# Patient Record
Sex: FEMALE | Race: WHITE
[De-identification: names, ages, dates, MRNs, and addresses within clinical notes are randomized per-mention and may not be internally consistent; named-entity substitution may affect disease eponyms.]

---

## 2017-01-11 ENCOUNTER — HOSPITAL ENCOUNTER (OUTPATIENT)
Dept: HOSPITAL 17 - PLAB | Age: 77
End: 2017-01-11
Attending: FAMILY MEDICINE
Payer: COMMERCIAL

## 2017-01-11 DIAGNOSIS — I10: Primary | ICD-10-CM

## 2017-01-11 LAB
ALP SERPL-CCNC: 68 U/L (ref 45–117)
ALT SERPL-CCNC: 22 U/L (ref 10–53)
ANION GAP SERPL CALC-SCNC: 6 MEQ/L (ref 5–15)
AST SERPL-CCNC: 15 U/L (ref 15–37)
BILIRUB SERPL-MCNC: 0.5 MG/DL (ref 0.2–1)
BUN SERPL-MCNC: 18 MG/DL (ref 7–18)
CHLORIDE SERPL-SCNC: 104 MEQ/L (ref 98–107)
CREAT UR-MCNC: 77 MG/DL (ref 27–300)
GFR SERPLBLD BASED ON 1.73 SQ M-ARVRAT: 61 ML/MIN (ref 89–?)
HCO3 BLD-SCNC: 30.2 MEQ/L (ref 21–32)
MICRO ALBUMIN RANDOM URINE RAW: 14 MG/L (ref 0–30)
MICROALBUMIN/CREAT RATIO RAND: 18 MG/G CRE (ref 0–30)
POTASSIUM SERPL-SCNC: 4.2 MEQ/L (ref 3.5–5.1)
SODIUM SERPL-SCNC: 140 MEQ/L (ref 136–145)

## 2017-01-11 PROCEDURE — 82043 UR ALBUMIN QUANTITATIVE: CPT

## 2017-01-11 PROCEDURE — 80053 COMPREHEN METABOLIC PANEL: CPT

## 2017-01-20 ENCOUNTER — HOSPITAL ENCOUNTER (EMERGENCY)
Dept: HOSPITAL 17 - NEPB | Age: 77
Discharge: HOME | End: 2017-01-20
Payer: COMMERCIAL

## 2017-01-20 VITALS
OXYGEN SATURATION: 95 % | HEART RATE: 94 BPM | RESPIRATION RATE: 18 BRPM | TEMPERATURE: 98.5 F | DIASTOLIC BLOOD PRESSURE: 87 MMHG | SYSTOLIC BLOOD PRESSURE: 150 MMHG

## 2017-01-20 VITALS — BODY MASS INDEX: 25.71 KG/M2 | HEIGHT: 65 IN | WEIGHT: 154.32 LBS

## 2017-01-20 DIAGNOSIS — E11.9: ICD-10-CM

## 2017-01-20 DIAGNOSIS — Q61.3: ICD-10-CM

## 2017-01-20 DIAGNOSIS — S16.1XXA: Primary | ICD-10-CM

## 2017-01-20 DIAGNOSIS — I10: ICD-10-CM

## 2017-01-20 DIAGNOSIS — E78.00: ICD-10-CM

## 2017-01-20 PROCEDURE — 96372 THER/PROPH/DIAG INJ SC/IM: CPT

## 2017-01-20 PROCEDURE — 99283 EMERGENCY DEPT VISIT LOW MDM: CPT

## 2017-01-20 NOTE — PD
HPI


Chief Complaint:  Musculoskeletal Complaint


Time Seen by Provider:  00:38


Travel History


International Travel<30 days:  No


Contact w/Intl Traveler<30days:  No


Traveled to known affect area:  No





History of Present Illness


HPI


76-year-old female with history of polycystic kidney disease presents for 

evaluation of neck stiffness.  Symptoms started 3 days ago.  She reports that 

she woke up with a stiffness in the right side of her neck.  The discomfort is 

worse with movement of the neck which prompted evaluation.  She has not used 

any over-the-counter medication for symptom relief.  She denies any trauma.  

She denies any numbness, tingling, weakness in the extremities.  No chest pain 

or shortness of breath.  No other complaints at this time.





PFSH


Past Medical History


Cancer:  No


Cardiovascular Problems:  Yes (HTN)


High Cholesterol:  Yes


Diabetes:  Yes (DIET CONTROLLED)


Diminished Hearing:  No


Endocrine:  Yes


Glaucoma:  No


Genitourinary:  No


Hepatitis:  No


Hiatal Hernia:  No


Hypertension:  Yes


Immune Disorder:  No


Musculoskeletal:  No


Neurologic:  No


Psychiatric:  No


Reproductive:  No


Respiratory:  No


Thyroid Disease:  No


Menopausal:  Yes





Past Surgical History


AICD:  No


Gynecologic Surgery:  Yes (TUBAL LIG.)


Joint Replacement:  No


Pacemaker:  No


Other Surgery:  Yes (CYST REMOVED FROM RIGHT FOOT -CYST REMOVED)





Social History


Alcohol Use:  Yes (WINE ONCE A MONTH)


Tobacco Use:  No


Substance Use:  No





Allergies-Medications


(Allergen,Severity, Reaction):  


Coded Allergies:  


     Sulfa (Verified  Allergy, Intermediate, DIZZY, VOMITING, 1/20/17)


Reported Meds & Prescriptions





Reported Meds & Active Scripts


Active


Capsagel Maximum Strength Topical (Capsaicin) 0.075% Gel 1 Applic TOPICAL BID 

10 Days


Robaxin (Methocarbamol) 500 Mg Tab 500 Mg PO TID


[walking boot]     


     Left leg


Reported


Lisinopril 10 mg (Lisinopril) 10 Mg Tab 10 Mg PO DAILY


Osteo Bi-Flextriple Triple Tab 1 Tab PO DAILY


Fish Oil 1,000 Mg Cap 1,000 Mg PO DAILY


Lovastatin 20 Mg Tab 20 Mg PO DAILY








Review of Systems


Except as stated in HPI:  all other systems reviewed are Neg





Physical Exam


Narrative


GENERAL: Well-developed well-nourished female in no acute distress, ambulatory 

in the ED.


SKIN: Warm and dry.


HEAD: Atraumatic. Normocephalic. 


EYES: Pupils equal and round. No scleral icterus. No injection or drainage. 


ENT: No nasal bleeding or discharge.  Mucous membranes pink and moist.


NECK: Trachea midline. No JVD. 


CARDIOVASCULAR: Regular rate and rhythm.  No murmur appreciated.


RESPIRATORY: No accessory muscle use. Clear to auscultation. Breath sounds 

equal bilaterally. 


MUSCULOSKELETAL: No obvious deformities.  There is no tenderness to palpation 

along the cervical or thoracic midline spine.  No lymphadenopathy.  There is 

some tenderness to palpation she cervical right-sided paravertebral 

musculature.  The patient maintains full range of motion of the neck.  Full 

range of motion of the upper extremities.


NEUROLOGICAL: Awake and alert. No obvious cranial nerve deficits.  Motor 

grossly within normal limits. Normal speech.





Data


Data


Last Documented VS





Vital Signs








  Date Time  Temp Pulse Resp B/P Pulse Ox O2 Delivery O2 Flow Rate FiO2


 


1/20/17 00:26 98.5 94 18 150/87 95 Room Air  








Orders





 Orphenadrine Inj (Norflex Inj) (1/20/17 01:00)








MDM


Medical Decision Making


Medical Screen Exam Complete:  Yes


Emergency Medical Condition:  Yes


Medical Record Reviewed:  Yes


Differential Diagnosis


Cervical strain, degenerative disc disease, spinal stenosis, malignancy, 

radiculopathy, herniated nucleus pulposus, carotid artery dissection


Narrative Course


76-year-old female who has had stiffness in the right side of her neck for the 

past 3 days after waking up with the discomfort.  Examination reveals a clearly 

musculoskeletal etiology to her symptoms.  Her pain is localized to the right 

cervical paravertebral musculature.  The plan is to treat the patient with 

topical capsaicin cream, oral muscle relaxants.  She has been advised to avoid 

NSAIDs in the past secondary to her history of polycystic kidney disease.  

Chart review reveals normal renal function on January 11.  She is stable for 

discharge.





Diagnosis





 Primary Impression:  


 Cervical strain


 Qualified Code:  S16.1XXA - Cervical strain, initial encounter





***Additional Instructions:


Medication as needed.  Do not drive or drink alcohol when taking Robaxin.  

Alternate cool compresses and warm compresses several times a day.  Follow-up 

in one week with primary care physician.  Return for any emergent medical 

conditions.


***Med/Other Pt SpecificInfo:  Prescription(s) given


Scripts


Capsaicin Topical (Capsagel Maximum Strength Topical)0.075% Gel1 Applic TOPICAL 

BID  10 Days  Ref 0


   Prov:Oz Thompson MD         1/20/17 


Methocarbamol (Robaxin)500 Mg Adm407 Mg PO TID  #20 TAB  Ref 0


   Prov:Oz Thompson MD         1/20/17


Disposition:  01 DISCHARGE HOME


Condition:  Stable








Clarence Saucedo Jan 20, 2017 00:52

## 2017-03-10 ENCOUNTER — HOSPITAL ENCOUNTER (OUTPATIENT)
Dept: HOSPITAL 17 - PLAB | Age: 77
End: 2017-03-10
Attending: FAMILY MEDICINE
Payer: COMMERCIAL

## 2017-03-10 DIAGNOSIS — E11.9: ICD-10-CM

## 2017-03-10 DIAGNOSIS — I10: ICD-10-CM

## 2017-03-10 DIAGNOSIS — Z00.00: Primary | ICD-10-CM

## 2017-03-10 DIAGNOSIS — E78.5: ICD-10-CM

## 2017-03-10 LAB
ALP SERPL-CCNC: 62 U/L (ref 45–117)
ALT SERPL-CCNC: 21 U/L (ref 10–53)
ANION GAP SERPL CALC-SCNC: 8 MEQ/L (ref 5–15)
AST SERPL-CCNC: 18 U/L (ref 15–37)
BASOPHILS # BLD AUTO: 0 TH/MM3 (ref 0–0.2)
BASOPHILS NFR BLD: 0.5 % (ref 0–2)
BILIRUB SERPL-MCNC: 0.8 MG/DL (ref 0.2–1)
BUN SERPL-MCNC: 20 MG/DL (ref 7–18)
CHLORIDE SERPL-SCNC: 100 MEQ/L (ref 98–107)
EOSINOPHIL # BLD: 0.1 TH/MM3 (ref 0–0.4)
EOSINOPHIL NFR BLD: 2.2 % (ref 0–4)
ERYTHROCYTE [DISTWIDTH] IN BLOOD BY AUTOMATED COUNT: 14.9 % (ref 11.6–17.2)
GFR SERPLBLD BASED ON 1.73 SQ M-ARVRAT: 53 ML/MIN (ref 89–?)
HCO3 BLD-SCNC: 28.9 MEQ/L (ref 21–32)
HCT VFR BLD CALC: 37.9 % (ref 35–46)
HDLC SERPL-MCNC: 73.6 MG/DL (ref 40–60)
HEMO FLAGS: (no result)
LDLC SERPL-MCNC: 68 MG/DL (ref 0–99)
LYMPHOCYTES # BLD AUTO: 0.9 TH/MM3 (ref 1–4.8)
LYMPHOCYTES NFR BLD AUTO: 21.8 % (ref 9–44)
MCH RBC QN AUTO: 28.4 PG (ref 27–34)
MCHC RBC AUTO-ENTMCNC: 32.9 % (ref 32–36)
MCV RBC AUTO: 86.5 FL (ref 80–100)
MONOCYTES NFR BLD: 12.1 % (ref 0–8)
NEUTROPHILS # BLD AUTO: 2.7 TH/MM3 (ref 1.8–7.7)
NEUTROPHILS NFR BLD AUTO: 63.4 % (ref 16–70)
PLATELET # BLD: 249 TH/MM3 (ref 150–450)
POTASSIUM SERPL-SCNC: 4.3 MEQ/L (ref 3.5–5.1)
RBC # BLD AUTO: 4.38 MIL/MM3 (ref 4–5.3)
SODIUM SERPL-SCNC: 137 MEQ/L (ref 136–145)
WBC # BLD AUTO: 4.3 TH/MM3 (ref 4–11)

## 2017-03-10 PROCEDURE — 84443 ASSAY THYROID STIM HORMONE: CPT

## 2017-03-10 PROCEDURE — 80053 COMPREHEN METABOLIC PANEL: CPT

## 2017-03-10 PROCEDURE — 80061 LIPID PANEL: CPT

## 2017-03-10 PROCEDURE — 85025 COMPLETE CBC W/AUTO DIFF WBC: CPT

## 2017-04-04 ENCOUNTER — HOSPITAL ENCOUNTER (OUTPATIENT)
Dept: HOSPITAL 17 - CPRE | Age: 77
End: 2017-04-04
Attending: OPTOMETRIST
Payer: COMMERCIAL

## 2017-04-04 DIAGNOSIS — Z01.812: ICD-10-CM

## 2017-04-04 DIAGNOSIS — R94.31: ICD-10-CM

## 2017-04-04 DIAGNOSIS — Z01.810: Primary | ICD-10-CM

## 2017-04-04 DIAGNOSIS — H25.811: ICD-10-CM

## 2017-04-04 LAB
BASOPHILS # BLD AUTO: 0 TH/MM3 (ref 0–0.2)
BASOPHILS NFR BLD: 0.5 % (ref 0–2)
EOSINOPHIL # BLD: 0.2 TH/MM3 (ref 0–0.4)
EOSINOPHIL NFR BLD: 3.2 % (ref 0–4)
ERYTHROCYTE [DISTWIDTH] IN BLOOD BY AUTOMATED COUNT: 15.5 % (ref 11.6–17.2)
HCT VFR BLD CALC: 39.3 % (ref 35–46)
HEMO FLAGS: (no result)
LYMPHOCYTES # BLD AUTO: 1.2 TH/MM3 (ref 1–4.8)
LYMPHOCYTES NFR BLD AUTO: 21.9 % (ref 9–44)
MCH RBC QN AUTO: 27.9 PG (ref 27–34)
MCHC RBC AUTO-ENTMCNC: 32.3 % (ref 32–36)
MCV RBC AUTO: 86.3 FL (ref 80–100)
MONOCYTES NFR BLD: 10.5 % (ref 0–8)
NEUTROPHILS # BLD AUTO: 3.5 TH/MM3 (ref 1.8–7.7)
NEUTROPHILS NFR BLD AUTO: 63.9 % (ref 16–70)
PLATELET # BLD: 247 TH/MM3 (ref 150–450)
RBC # BLD AUTO: 4.56 MIL/MM3 (ref 4–5.3)
WBC # BLD AUTO: 5.4 TH/MM3 (ref 4–11)

## 2017-04-04 PROCEDURE — 85025 COMPLETE CBC W/AUTO DIFF WBC: CPT

## 2017-04-04 PROCEDURE — 93005 ELECTROCARDIOGRAM TRACING: CPT

## 2017-04-04 PROCEDURE — 36415 COLL VENOUS BLD VENIPUNCTURE: CPT

## 2017-04-04 NOTE — EKG
Date Performed: 04/04/2017       Time Performed: 09:38:11

 

PTAGE:      76 years

 

EKG:      SINUS BRADYCARDIA POSSIBLE LEFT ATRIAL ENLARGEMENT BORDERLINE ECG Compared to prior tracing
 no significant change 

 

 PREVIOUS TRACING             5/22/2014

 

DOCTOR:   Kourtney Zuluaga  Interpretating Date/Time  04/04/2017 16:02:10

## 2017-04-20 ENCOUNTER — HOSPITAL ENCOUNTER (OUTPATIENT)
Dept: HOSPITAL 17 - PHSDC | Age: 77
Discharge: HOME | End: 2017-04-20
Attending: OPTOMETRIST
Payer: COMMERCIAL

## 2017-04-20 VITALS
RESPIRATION RATE: 14 BRPM | OXYGEN SATURATION: 97 % | SYSTOLIC BLOOD PRESSURE: 133 MMHG | HEART RATE: 52 BPM | DIASTOLIC BLOOD PRESSURE: 81 MMHG

## 2017-04-20 VITALS — BODY MASS INDEX: 22.5 KG/M2 | HEIGHT: 66 IN | WEIGHT: 139.99 LBS

## 2017-04-20 VITALS
OXYGEN SATURATION: 100 % | SYSTOLIC BLOOD PRESSURE: 162 MMHG | DIASTOLIC BLOOD PRESSURE: 80 MMHG | HEART RATE: 48 BPM | TEMPERATURE: 98 F | RESPIRATION RATE: 16 BRPM

## 2017-04-20 VITALS — TEMPERATURE: 97.6 F

## 2017-04-20 DIAGNOSIS — H25.811: Primary | ICD-10-CM

## 2017-04-20 PROCEDURE — 66984 XCAPSL CTRC RMVL W/O ECP: CPT

## 2017-04-20 PROCEDURE — V2632 POST CHMBR INTRAOCULAR LENS: HCPCS

## 2017-04-20 PROCEDURE — 00142 ANES PX ON EYE LENS SURGERY: CPT

## 2017-04-20 RX ADMIN — LIDOCAINE HYDROCHLORIDE ONE ML: 10 INJECTION, SOLUTION EPIDURAL; INFILTRATION; INTRACAUDAL; PERINEURAL at 10:00

## 2017-04-20 RX ADMIN — TOBRAMYCIN AND DEXAMETHASONE ONE APPLIC: 3; 1 OINTMENT OPHTHALMIC at 00:00

## 2017-04-20 RX ADMIN — LIDOCAINE HYDROCHLORIDE ONE ML: 10 INJECTION, SOLUTION EPIDURAL; INFILTRATION; INTRACAUDAL; PERINEURAL at 00:00

## 2017-04-20 RX ADMIN — TOBRAMYCIN AND DEXAMETHASONE ONE APPLIC: 3; 1 OINTMENT OPHTHALMIC at 10:10

## 2017-04-23 NOTE — MP
cc:

JOSEPH RAMIREZ MD

****

 

 

[facility] #[id number]

 

DATE OF SURGERY: 4/20/2017

 

PREOPERATIVE DIAGNOSIS:

Visually significant cataract, right eye.

 

POSTOPERATIVE DIAGNOSIS:

Visually significant cataract, right eye.

 

OPERATION:

Phacoemulsification with posterior chamber lens

implantation, right eye.

 

SURGEON:

Joseph Ramirez MD

 

ANESTHESIA:

Retrobulbar with MAC.

 

COMPLICATIONS:

None.

 

PROCEDURE:

After informed consent was obtained, the patient was brought into the operative

suite and placed on appropriate monitors by the Anesthesia Service.  The

patient had received a prior retrobulbar injection of local anesthetic by the

Anesthesia Service in the holding area.  The patient's operative eye was then

prepped and draped in the usual sterile fashion.  A wire lid speculum was

placed.

 

A paracentesis incision was made in the peripheral cornea with a 1 mm francisco

keratome.  The anterior chamber was filled with viscoelastic.  The anterior

chamber was then entered through a stepped, clear corneal incision using a

sharp 3 mm francisco keratome.  A circular tear capsulorrhexis was then made with

a bent needle cystitome.  Following hydrodissection of the lens nucleus with

balanced saline, phaco-emulsification of the nucleus was performed using a

modified chopping technique.  The remaining cortex was removed with

irrigation/aspiration.  The prior two procedures were both performed using the

handpieces of the [employer] phaco unit.

 

The capsular bag was then filled with viscoelastic.  The intraocular lens was

then injected into the capsular bag and positioned.  The type of intraocular

lens and its power can be found elsewhere in this chart.  The remaining

viscoelastic was then removed from the anterior chamber with the IA handpiece.

 

 

The anterior chamber was reformed with balanced saline.  The wound was then

closed securely with stromal hydration.  It was found to be watertight to an

intraocular pressure of at least 30 mmHg by palpation.   A small amount of

balanced salt solution was then removed through the paracentesis site and the

intraocular pressure at the end of the case was approximately 20 by palpation.

 

 

All drapes were then removed.  TobraDex ointment was then placed in the eye,

which was closed beneath a semi-pressure patch dressing.

 

The patient tolerated this procedure well and left the operating room awake and

alert.  The patient is to follow-up in my office in the morning.

 

 

 

 

                              _________________________________

                              MD MARLON Jenkins/NAJMA

D:  4/20/2017/10:15 AM

T:  4/23/2017/11:40 AM

Visit #:  [id number]

Job #:  [id number]

## 2017-06-01 ENCOUNTER — HOSPITAL ENCOUNTER (OUTPATIENT)
Dept: HOSPITAL 17 - PHSDC | Age: 77
Discharge: HOME | End: 2017-06-01
Attending: OPTOMETRIST
Payer: COMMERCIAL

## 2017-06-01 VITALS — HEIGHT: 65 IN | WEIGHT: 141.1 LBS | BODY MASS INDEX: 23.51 KG/M2

## 2017-06-01 VITALS
SYSTOLIC BLOOD PRESSURE: 150 MMHG | TEMPERATURE: 97.7 F | DIASTOLIC BLOOD PRESSURE: 77 MMHG | OXYGEN SATURATION: 98 % | RESPIRATION RATE: 20 BRPM

## 2017-06-01 VITALS — HEART RATE: 43 BPM

## 2017-06-01 VITALS
OXYGEN SATURATION: 98 % | HEART RATE: 49 BPM | DIASTOLIC BLOOD PRESSURE: 79 MMHG | SYSTOLIC BLOOD PRESSURE: 143 MMHG | RESPIRATION RATE: 14 BRPM

## 2017-06-01 VITALS — TEMPERATURE: 97.3 F

## 2017-06-01 DIAGNOSIS — Z98.41: ICD-10-CM

## 2017-06-01 DIAGNOSIS — N18.9: ICD-10-CM

## 2017-06-01 DIAGNOSIS — N26.1: ICD-10-CM

## 2017-06-01 DIAGNOSIS — I12.9: ICD-10-CM

## 2017-06-01 DIAGNOSIS — E11.22: ICD-10-CM

## 2017-06-01 DIAGNOSIS — E78.5: ICD-10-CM

## 2017-06-01 DIAGNOSIS — Z88.2: ICD-10-CM

## 2017-06-01 DIAGNOSIS — H25.812: Primary | ICD-10-CM

## 2017-06-01 DIAGNOSIS — I27.2: ICD-10-CM

## 2017-06-01 PROCEDURE — 66984 XCAPSL CTRC RMVL W/O ECP: CPT

## 2017-06-01 PROCEDURE — V2632 POST CHMBR INTRAOCULAR LENS: HCPCS

## 2017-06-02 NOTE — MP
cc:

JOSEPH RAMIREZ M.D.

****

 

[facility] #[id number]

 

DATE OF SURGERY

6/1/17

 

POSTOPERATIVE DIAGNOSIS:     Visually significant cataract left eye.

 

OPERATION:          Phacoemulsification with posterior chamber lens

                  implantation, left eye.

 

SURGEON:          Joseph Ramirez MD

 

ANESTHESIA:       Retrobulbar with MAC.

 

COMPLICATIONS: None.

 

PROCEDURE:        After informed consent was obtained, the patient was brought

into the operative suite and placed on appropriate monitors by the Anesthesia

Service.  The patient had received a prior retrobulbar injection of local

anesthetic by the Anesthesia Service in the holding area.  The patient's

operative eye was then prepped and draped in the usual sterile fashion.  A wire

lid speculum was placed.

 

A paracentesis incision was made in the peripheral cornea with a 1 mm francisco

keratome.  The anterior chamber was filled with viscoelastic.  The anterior

chamber was then entered through a stepped, clear corneal incision using a

sharp 3 mm francisco keratome.  A circular tear capsulorrhexis was then made with

a bent needle cystitome.  Following hydrodissection of the lens nucleus with

balanced saline, phaco-emulsification of the nucleus was performed using a

modified chopping technique.  The remaining cortex was removed with

irrigation/aspiration.  The prior two procedures were both performed using the

handpieces of the [employer] phaco unit.

 

The capsular bag was then filled with viscoelastic.  The intraocular lens was

then injected into the capsular bag and positioned.  The type of intraocular

lens and its power can be found elsewhere in this chart.  The remaining

viscoelastic was then removed from the anterior chamber with the IA handpiece.

 

 

The anterior chamber was reformed with balanced saline.  The wound was then

closed securely with stromal hydration.  It was found to be watertight to an

intraocular pressure of at least 30 mmHg by palpation.   A small amount of

balanced salt solution was then removed through the paracentesis site and the

intraocular pressure at the end of the case was approximately 20 by palpation.

 

 

All drapes were then removed.  TobraDex ointment was then placed in the eye,

which was closed beneath a semi-pressure patch dressing.

 

The patient tolerated this procedure well and left the operating room awake and

alert.  The patient is to follow-up in my office in the morning.

 

 

 

                              _________________________________

                              MD MARLON Jenkins/

D:  6/1/2017/10:34 AM

T:  6/2/2017/8:12 PM

Visit #:  [id number]

Job #:  [id number]

## 2017-08-18 ENCOUNTER — HOSPITAL ENCOUNTER (OUTPATIENT)
Dept: HOSPITAL 17 - PLAB | Age: 77
End: 2017-08-18
Attending: FAMILY MEDICINE
Payer: COMMERCIAL

## 2017-08-18 DIAGNOSIS — E11.9: ICD-10-CM

## 2017-08-18 DIAGNOSIS — E78.5: Primary | ICD-10-CM

## 2017-08-18 LAB
ALP SERPL-CCNC: 57 U/L (ref 45–117)
ALT SERPL-CCNC: 21 U/L (ref 10–53)
ANION GAP SERPL CALC-SCNC: 8 MEQ/L (ref 5–15)
AST SERPL-CCNC: 17 U/L (ref 15–37)
BILIRUB SERPL-MCNC: 0.7 MG/DL (ref 0.2–1)
BUN SERPL-MCNC: 23 MG/DL (ref 7–18)
CHLORIDE SERPL-SCNC: 102 MEQ/L (ref 98–107)
GFR SERPLBLD BASED ON 1.73 SQ M-ARVRAT: 45 ML/MIN (ref 89–?)
HCO3 BLD-SCNC: 26.2 MEQ/L (ref 21–32)
HDLC SERPL-MCNC: 74.8 MG/DL (ref 40–60)
HEMOGLOBIN A1A: 1.4 %
HEMOGLOBIN A1B: 1.9 %
HEMOGLOBIN AO: 83.4 %
HEMOGLOBIN LA1C: 2.2 %
HEMOGLOBIN P3: 4.1 %
LDLC SERPL-MCNC: 90 MG/DL (ref 0–99)
POTASSIUM SERPL-SCNC: 4.4 MEQ/L (ref 3.5–5.1)
SODIUM SERPL-SCNC: 136 MEQ/L (ref 136–145)

## 2017-08-18 PROCEDURE — 83036 HEMOGLOBIN GLYCOSYLATED A1C: CPT

## 2017-08-18 PROCEDURE — 80053 COMPREHEN METABOLIC PANEL: CPT

## 2017-08-18 PROCEDURE — 80061 LIPID PANEL: CPT

## 2018-01-11 ENCOUNTER — HOSPITAL ENCOUNTER (OUTPATIENT)
Dept: HOSPITAL 17 - PLAB | Age: 78
End: 2018-01-11
Attending: FAMILY MEDICINE
Payer: COMMERCIAL

## 2018-01-11 DIAGNOSIS — E11.9: Primary | ICD-10-CM

## 2018-01-11 DIAGNOSIS — E78.5: ICD-10-CM

## 2018-01-11 LAB
ALBUMIN SERPL-MCNC: 3.9 GM/DL (ref 3.4–5)
ALP SERPL-CCNC: 58 U/L (ref 45–117)
ALT SERPL-CCNC: 28 U/L (ref 10–53)
AST SERPL-CCNC: 21 U/L (ref 15–37)
BILIRUB SERPL-MCNC: 0.5 MG/DL (ref 0.2–1)
BUN SERPL-MCNC: 13 MG/DL (ref 7–18)
CALCIUM SERPL-MCNC: 9.2 MG/DL (ref 8.5–10.1)
CHLORIDE SERPL-SCNC: 105 MEQ/L (ref 98–107)
CHOLEST SERPL-MCNC: 185 MG/DL (ref 120–200)
CHOLESTEROL/ HDL RATIO: 2.36 RATIO
CREAT SERPL-MCNC: 0.96 MG/DL (ref 0.5–1)
GFR SERPLBLD BASED ON 1.73 SQ M-ARVRAT: 57 ML/MIN (ref 89–?)
HBA1C MFR BLD: 6.1 % (ref 4.3–6)
HCO3 BLD-SCNC: 28.3 MEQ/L (ref 21–32)
HDLC SERPL-MCNC: 78.3 MG/DL (ref 40–60)
LDLC SERPL-MCNC: 96 MG/DL (ref 0–99)
PROT SERPL-MCNC: 7.2 GM/DL (ref 6.4–8.2)
SODIUM SERPL-SCNC: 140 MEQ/L (ref 136–145)
TRIGL SERPL-MCNC: 55 MG/DL (ref 42–150)

## 2018-01-11 PROCEDURE — 83036 HEMOGLOBIN GLYCOSYLATED A1C: CPT

## 2018-01-11 PROCEDURE — 36415 COLL VENOUS BLD VENIPUNCTURE: CPT

## 2018-01-11 PROCEDURE — 80061 LIPID PANEL: CPT

## 2018-01-11 PROCEDURE — 84443 ASSAY THYROID STIM HORMONE: CPT

## 2018-01-11 PROCEDURE — 80053 COMPREHEN METABOLIC PANEL: CPT
